# Patient Record
Sex: FEMALE | Race: WHITE | NOT HISPANIC OR LATINO | Employment: OTHER | URBAN - NONMETROPOLITAN AREA
[De-identification: names, ages, dates, MRNs, and addresses within clinical notes are randomized per-mention and may not be internally consistent; named-entity substitution may affect disease eponyms.]

---

## 2019-09-12 ENCOUNTER — TRANSCRIBE ORDERS (OUTPATIENT)
Dept: ADMINISTRATIVE | Facility: HOSPITAL | Age: 54
End: 2019-09-12

## 2019-09-12 ENCOUNTER — HOSPITAL ENCOUNTER (OUTPATIENT)
Dept: GENERAL RADIOLOGY | Facility: HOSPITAL | Age: 54
Discharge: HOME | End: 2019-09-12
Admitting: ORTHOPAEDIC SURGERY

## 2019-09-12 DIAGNOSIS — M54.50 LUMBAR PAIN: ICD-10-CM

## 2019-09-12 DIAGNOSIS — M19.171 POST-TRAUMATIC ARTHRITIS OF RIGHT ANKLE: Primary | ICD-10-CM

## 2019-09-12 PROCEDURE — 72100 X-RAY EXAM L-S SPINE 2/3 VWS: CPT

## 2019-09-12 PROCEDURE — 73610 X-RAY EXAM OF ANKLE: CPT

## 2022-11-16 ENCOUNTER — OFFICE VISIT (OUTPATIENT)
Dept: OBSTETRICS AND GYNECOLOGY | Facility: CLINIC | Age: 57
End: 2022-11-16

## 2022-11-16 VITALS
WEIGHT: 135.4 LBS | SYSTOLIC BLOOD PRESSURE: 130 MMHG | BODY MASS INDEX: 26.58 KG/M2 | HEIGHT: 60 IN | DIASTOLIC BLOOD PRESSURE: 60 MMHG

## 2022-11-16 DIAGNOSIS — R39.9 UTI SYMPTOMS: ICD-10-CM

## 2022-11-16 DIAGNOSIS — R35.0 URINE FREQUENCY: ICD-10-CM

## 2022-11-16 DIAGNOSIS — R10.32 LEFT LOWER QUADRANT PAIN: ICD-10-CM

## 2022-11-16 DIAGNOSIS — Z12.4 SCREENING FOR CERVICAL CANCER: ICD-10-CM

## 2022-11-16 DIAGNOSIS — Z01.419 WELL WOMAN EXAM WITH ROUTINE GYNECOLOGICAL EXAM: Primary | ICD-10-CM

## 2022-11-16 LAB
BILIRUB BLD-MCNC: NEGATIVE MG/DL
CLARITY, POC: CLEAR
COLOR UR: YELLOW
EXPIRATION DATE: ABNORMAL
GLUCOSE UR STRIP-MCNC: NEGATIVE MG/DL
KETONES UR QL: ABNORMAL
LEUKOCYTE EST, POC: ABNORMAL
Lab: ABNORMAL
NITRITE UR-MCNC: NEGATIVE MG/ML
PH UR: 6.5 [PH] (ref 5–8)
PROT UR STRIP-MCNC: NEGATIVE MG/DL
RBC # UR STRIP: ABNORMAL /UL
SP GR UR: 1.01 (ref 1–1.03)
UROBILINOGEN UR QL: NORMAL

## 2022-11-16 PROCEDURE — 99396 PREV VISIT EST AGE 40-64: CPT | Performed by: PHYSICIAN ASSISTANT

## 2022-11-16 PROCEDURE — 99213 OFFICE O/P EST LOW 20 MIN: CPT | Performed by: PHYSICIAN ASSISTANT

## 2022-11-16 RX ORDER — METAXALONE 800 MG/1
800 TABLET ORAL 3 TIMES DAILY PRN
COMMUNITY
Start: 2022-11-02

## 2022-11-16 RX ORDER — IPRATROPIUM BROMIDE AND ALBUTEROL SULFATE 2.5; .5 MG/3ML; MG/3ML
1 SOLUTION RESPIRATORY (INHALATION)
COMMUNITY
Start: 2022-08-01

## 2022-11-16 RX ORDER — AMANTADINE HYDROCHLORIDE 100 MG/1
TABLET ORAL
COMMUNITY
Start: 2022-11-09 | End: 2023-03-08
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
## 2022-11-17 NOTE — PATIENT INSTRUCTIONS
Self breast exam monthly  Annual mammogram  vit D 2000 mg daily  Regular excercise   Follow up for pelvic ultrasound  Urine for culture

## 2022-11-18 LAB — REF LAB TEST METHOD: NORMAL

## 2022-11-21 LAB
APPEARANCE UR: CLEAR
BACTERIA #/AREA URNS HPF: ABNORMAL /HPF
BACTERIA UR CULT: ABNORMAL
BACTERIA UR CULT: ABNORMAL
BILIRUB UR QL STRIP: NEGATIVE
CASTS URNS MICRO: ABNORMAL
COLOR UR: YELLOW
EPI CELLS #/AREA URNS HPF: ABNORMAL /HPF
GLUCOSE UR QL STRIP: NEGATIVE
HGB UR QL STRIP: NEGATIVE
KETONES UR QL STRIP: NEGATIVE
LEUKOCYTE ESTERASE UR QL STRIP: ABNORMAL
NITRITE UR QL STRIP: NEGATIVE
OTHER ANTIBIOTIC SUSC ISLT: ABNORMAL
PH UR STRIP: 6.5 [PH] (ref 5–8)
PROT UR QL STRIP: NEGATIVE
RBC #/AREA URNS HPF: ABNORMAL /HPF
SP GR UR STRIP: 1.01 (ref 1–1.03)
UROBILINOGEN UR STRIP-MCNC: ABNORMAL MG/DL
WBC #/AREA URNS HPF: ABNORMAL /HPF

## 2022-11-21 RX ORDER — NITROFURANTOIN 25; 75 MG/1; MG/1
100 CAPSULE ORAL 2 TIMES DAILY
Qty: 14 CAPSULE | Refills: 0 | Status: SHIPPED | OUTPATIENT
Start: 2022-11-21 | End: 2022-11-28

## 2022-11-21 NOTE — PROGRESS NOTES
Please inform patient urine culture returned positive and I have sent in antibiotic to her pharmacy. Keep appt for ultrasound

## 2022-12-07 ENCOUNTER — TELEPHONE (OUTPATIENT)
Dept: OBSTETRICS AND GYNECOLOGY | Facility: CLINIC | Age: 57
End: 2022-12-07

## 2022-12-07 NOTE — TELEPHONE ENCOUNTER
Lulu,      Patient returned your call, I did not see any notes left to tell her, if you need me to call patient back, I dont mind at all.        Thanks,    Mima

## 2022-12-07 NOTE — TELEPHONE ENCOUNTER
Caller: Didier Rodriguez    Relationship: Self    Best call back number: [phone]    What is the best time to reach you: ANYTIME    Who are you requesting to speak with (clinical staff, provider,  specific staff member): RETURNING CALL FROM THIS MORNING    Do you know the name of the person who called:     What was the call regarding: HER APPT YESTERDAY    Do you require a callback: YES

## 2023-03-08 PROCEDURE — U0004 COV-19 TEST NON-CDC HGH THRU: HCPCS | Performed by: NURSE PRACTITIONER

## 2023-12-04 ENCOUNTER — TRANSCRIBE ORDERS (OUTPATIENT)
Dept: ADMINISTRATIVE | Facility: HOSPITAL | Age: 58
End: 2023-12-04
Payer: COMMERCIAL

## 2023-12-04 DIAGNOSIS — M54.2 CERVICALGIA: Primary | ICD-10-CM

## 2023-12-20 ENCOUNTER — HOSPITAL ENCOUNTER (OUTPATIENT)
Dept: CT IMAGING | Facility: HOSPITAL | Age: 58
Discharge: HOME | End: 2023-12-20
Admitting: PHYSICIAN ASSISTANT
Payer: COMMERCIAL

## 2023-12-20 DIAGNOSIS — M54.2 CERVICALGIA: ICD-10-CM

## 2023-12-20 PROCEDURE — 72125 CT NECK SPINE W/O DYE: CPT

## 2025-04-21 ENCOUNTER — APPOINTMENT (OUTPATIENT)
Dept: CT IMAGING | Facility: HOSPITAL | Age: 60
End: 2025-04-21
Payer: COMMERCIAL

## 2025-04-21 ENCOUNTER — APPOINTMENT (OUTPATIENT)
Dept: GENERAL RADIOLOGY | Facility: HOSPITAL | Age: 60
End: 2025-04-21
Payer: COMMERCIAL

## 2025-04-21 ENCOUNTER — HOSPITAL ENCOUNTER (EMERGENCY)
Facility: HOSPITAL | Age: 60
Discharge: HOME | End: 2025-04-21
Attending: STUDENT IN AN ORGANIZED HEALTH CARE EDUCATION/TRAINING PROGRAM | Admitting: STUDENT IN AN ORGANIZED HEALTH CARE EDUCATION/TRAINING PROGRAM
Payer: COMMERCIAL

## 2025-04-21 VITALS
HEIGHT: 60 IN | WEIGHT: 130 LBS | TEMPERATURE: 98 F | RESPIRATION RATE: 17 BRPM | DIASTOLIC BLOOD PRESSURE: 83 MMHG | BODY MASS INDEX: 25.52 KG/M2 | SYSTOLIC BLOOD PRESSURE: 134 MMHG | HEART RATE: 70 BPM | OXYGEN SATURATION: 100 %

## 2025-04-21 DIAGNOSIS — R10.9 ABDOMINAL PAIN, UNSPECIFIED ABDOMINAL LOCATION: Primary | ICD-10-CM

## 2025-04-21 DIAGNOSIS — K80.20 CALCULUS OF GALLBLADDER WITHOUT CHOLECYSTITIS WITHOUT OBSTRUCTION: ICD-10-CM

## 2025-04-21 LAB
ALBUMIN SERPL-MCNC: 4.8 G/DL (ref 3.5–5.2)
ALBUMIN/GLOB SERPL: 1.2 G/DL
ALP SERPL-CCNC: 98 U/L (ref 39–117)
ALT SERPL W P-5'-P-CCNC: 22 U/L (ref 1–33)
ANION GAP SERPL CALCULATED.3IONS-SCNC: 9.9 MMOL/L (ref 5–15)
AST SERPL-CCNC: 20 U/L (ref 1–32)
BACTERIA UR QL AUTO: ABNORMAL /HPF
BASOPHILS # BLD AUTO: 0.02 10*3/MM3 (ref 0–0.2)
BASOPHILS NFR BLD AUTO: 0.2 % (ref 0–1.5)
BILIRUB SERPL-MCNC: 0.3 MG/DL (ref 0–1.2)
BILIRUB UR QL STRIP: NEGATIVE
BUN SERPL-MCNC: 15 MG/DL (ref 8–23)
BUN/CREAT SERPL: 21.1 (ref 7–25)
CALCIUM SPEC-SCNC: 9.5 MG/DL (ref 8.6–10.5)
CHLORIDE SERPL-SCNC: 98 MMOL/L (ref 98–107)
CLARITY UR: CLEAR
CO2 SERPL-SCNC: 28.1 MMOL/L (ref 22–29)
COLOR UR: YELLOW
CREAT SERPL-MCNC: 0.71 MG/DL (ref 0.57–1)
D-LACTATE SERPL-SCNC: 1.7 MMOL/L (ref 0.5–2)
DEPRECATED RDW RBC AUTO: 41.1 FL (ref 37–54)
EGFRCR SERPLBLD CKD-EPI 2021: 97.5 ML/MIN/1.73
EOSINOPHIL # BLD AUTO: 0.3 10*3/MM3 (ref 0–0.4)
EOSINOPHIL NFR BLD AUTO: 3.4 % (ref 0.3–6.2)
ERYTHROCYTE [DISTWIDTH] IN BLOOD BY AUTOMATED COUNT: 13.1 % (ref 12.3–15.4)
GEN 5 1HR TROPONIN T REFLEX: 8 NG/L
GLOBULIN UR ELPH-MCNC: 3.9 GM/DL
GLUCOSE SERPL-MCNC: 127 MG/DL (ref 65–99)
GLUCOSE UR STRIP-MCNC: NEGATIVE MG/DL
HCT VFR BLD AUTO: 40.7 % (ref 34–46.6)
HGB BLD-MCNC: 13.7 G/DL (ref 12–15.9)
HGB UR QL STRIP.AUTO: NEGATIVE
HOLD SPECIMEN: NORMAL
HOLD SPECIMEN: NORMAL
HYALINE CASTS UR QL AUTO: ABNORMAL /LPF
IMM GRANULOCYTES # BLD AUTO: 0.02 10*3/MM3 (ref 0–0.05)
IMM GRANULOCYTES NFR BLD AUTO: 0.2 % (ref 0–0.5)
KETONES UR QL STRIP: NEGATIVE
LEUKOCYTE ESTERASE UR QL STRIP.AUTO: ABNORMAL
LIPASE SERPL-CCNC: 66 U/L (ref 13–60)
LYMPHOCYTES # BLD AUTO: 3.43 10*3/MM3 (ref 0.7–3.1)
LYMPHOCYTES NFR BLD AUTO: 39.2 % (ref 19.6–45.3)
MCH RBC QN AUTO: 28.6 PG (ref 26.6–33)
MCHC RBC AUTO-ENTMCNC: 33.7 G/DL (ref 31.5–35.7)
MCV RBC AUTO: 85 FL (ref 79–97)
MONOCYTES # BLD AUTO: 0.86 10*3/MM3 (ref 0.1–0.9)
MONOCYTES NFR BLD AUTO: 9.8 % (ref 5–12)
NEUTROPHILS NFR BLD AUTO: 4.11 10*3/MM3 (ref 1.7–7)
NEUTROPHILS NFR BLD AUTO: 47.2 % (ref 42.7–76)
NITRITE UR QL STRIP: NEGATIVE
NRBC BLD AUTO-RTO: 0 /100 WBC (ref 0–0.2)
PH UR STRIP.AUTO: 6 [PH] (ref 5–8)
PLATELET # BLD AUTO: 340 10*3/MM3 (ref 140–450)
PMV BLD AUTO: 9 FL (ref 6–12)
POTASSIUM SERPL-SCNC: 4.4 MMOL/L (ref 3.5–5.2)
PROT SERPL-MCNC: 8.7 G/DL (ref 6–8.5)
PROT UR QL STRIP: NEGATIVE
RBC # BLD AUTO: 4.79 10*6/MM3 (ref 3.77–5.28)
RBC # UR STRIP: ABNORMAL /HPF
REF LAB TEST METHOD: ABNORMAL
SODIUM SERPL-SCNC: 136 MMOL/L (ref 136–145)
SP GR UR STRIP: 1.01 (ref 1–1.03)
SQUAMOUS #/AREA URNS HPF: ABNORMAL /HPF
TROPONIN T NUMERIC DELTA: -1 NG/L
TROPONIN T SERPL HS-MCNC: 9 NG/L
UROBILINOGEN UR QL STRIP: ABNORMAL
WBC # UR STRIP: ABNORMAL /HPF
WBC NRBC COR # BLD AUTO: 8.74 10*3/MM3 (ref 3.4–10.8)
WHOLE BLOOD HOLD COAG: NORMAL
WHOLE BLOOD HOLD SPECIMEN: NORMAL

## 2025-04-21 PROCEDURE — 96374 THER/PROPH/DIAG INJ IV PUSH: CPT

## 2025-04-21 PROCEDURE — 84484 ASSAY OF TROPONIN QUANT: CPT

## 2025-04-21 PROCEDURE — 83605 ASSAY OF LACTIC ACID: CPT

## 2025-04-21 PROCEDURE — 36415 COLL VENOUS BLD VENIPUNCTURE: CPT

## 2025-04-21 PROCEDURE — 25010000002 ONDANSETRON PER 1 MG: Performed by: STUDENT IN AN ORGANIZED HEALTH CARE EDUCATION/TRAINING PROGRAM

## 2025-04-21 PROCEDURE — 84484 ASSAY OF TROPONIN QUANT: CPT | Performed by: STUDENT IN AN ORGANIZED HEALTH CARE EDUCATION/TRAINING PROGRAM

## 2025-04-21 PROCEDURE — 71275 CT ANGIOGRAPHY CHEST: CPT

## 2025-04-21 PROCEDURE — 96375 TX/PRO/DX INJ NEW DRUG ADDON: CPT

## 2025-04-21 PROCEDURE — 25510000001 IOPAMIDOL 61 % SOLUTION: Performed by: STUDENT IN AN ORGANIZED HEALTH CARE EDUCATION/TRAINING PROGRAM

## 2025-04-21 PROCEDURE — 80053 COMPREHEN METABOLIC PANEL: CPT

## 2025-04-21 PROCEDURE — 83690 ASSAY OF LIPASE: CPT

## 2025-04-21 PROCEDURE — 93005 ELECTROCARDIOGRAM TRACING: CPT | Performed by: STUDENT IN AN ORGANIZED HEALTH CARE EDUCATION/TRAINING PROGRAM

## 2025-04-21 PROCEDURE — 85025 COMPLETE CBC W/AUTO DIFF WBC: CPT

## 2025-04-21 PROCEDURE — 74174 CTA ABD&PLVS W/CONTRAST: CPT

## 2025-04-21 PROCEDURE — 99285 EMERGENCY DEPT VISIT HI MDM: CPT | Performed by: STUDENT IN AN ORGANIZED HEALTH CARE EDUCATION/TRAINING PROGRAM

## 2025-04-21 PROCEDURE — 81001 URINALYSIS AUTO W/SCOPE: CPT | Performed by: STUDENT IN AN ORGANIZED HEALTH CARE EDUCATION/TRAINING PROGRAM

## 2025-04-21 PROCEDURE — 71046 X-RAY EXAM CHEST 2 VIEWS: CPT

## 2025-04-21 PROCEDURE — 93005 ELECTROCARDIOGRAM TRACING: CPT

## 2025-04-21 PROCEDURE — 25010000002 MORPHINE PER 10 MG: Performed by: STUDENT IN AN ORGANIZED HEALTH CARE EDUCATION/TRAINING PROGRAM

## 2025-04-21 RX ORDER — DICYCLOMINE HCL 20 MG
20 TABLET ORAL EVERY 6 HOURS PRN
Qty: 28 TABLET | Refills: 0 | Status: SHIPPED | OUTPATIENT
Start: 2025-04-21 | End: 2025-04-28

## 2025-04-21 RX ORDER — MELOXICAM 7.5 MG/1
7.5 TABLET ORAL DAILY
Qty: 7 TABLET | Refills: 0 | Status: SHIPPED | OUTPATIENT
Start: 2025-04-21 | End: 2025-04-28

## 2025-04-21 RX ORDER — IOPAMIDOL 612 MG/ML
100 INJECTION, SOLUTION INTRAVASCULAR
Status: COMPLETED | OUTPATIENT
Start: 2025-04-21 | End: 2025-04-21

## 2025-04-21 RX ORDER — ONDANSETRON 4 MG/1
4 TABLET, ORALLY DISINTEGRATING ORAL EVERY 8 HOURS PRN
Qty: 21 TABLET | Refills: 0 | Status: SHIPPED | OUTPATIENT
Start: 2025-04-21 | End: 2025-04-28

## 2025-04-21 RX ORDER — ONDANSETRON 2 MG/ML
4 INJECTION INTRAMUSCULAR; INTRAVENOUS ONCE
Status: COMPLETED | OUTPATIENT
Start: 2025-04-21 | End: 2025-04-21

## 2025-04-21 RX ORDER — SODIUM CHLORIDE 0.9 % (FLUSH) 0.9 %
10 SYRINGE (ML) INJECTION AS NEEDED
Status: DISCONTINUED | OUTPATIENT
Start: 2025-04-21 | End: 2025-04-21 | Stop reason: HOSPADM

## 2025-04-21 RX ADMIN — MORPHINE SULFATE 4 MG: 4 INJECTION, SOLUTION INTRAMUSCULAR; INTRAVENOUS at 18:20

## 2025-04-21 RX ADMIN — IOPAMIDOL 100 ML: 612 INJECTION, SOLUTION INTRAVENOUS at 19:03

## 2025-04-21 RX ADMIN — ONDANSETRON 4 MG: 2 INJECTION INTRAMUSCULAR; INTRAVENOUS at 18:20

## 2025-04-21 NOTE — ED PROVIDER NOTES
EMERGENCY DEPARTMENT ENCOUNTER    Pt Name: Didier Rodriguez  MRN: [id number]  Pt :   1965  Room Number:  02SF02  Date of encounter:  2025  PCP: Dwayne Marks DO  ED Provider: Julian Gonzales PA-C    Historian: Patient, nursing notes      HPI:  Chief Complaint: Abdominal pain, chest pain, pain radiating to neck and arm, tingling        Context: Didier Rodriguez is a 59 y.o. female who presents to the ED c/o abdominal pain starting this morning.  Patient also reports abdominal distention, and a numbing and tingling sensation radiating up into her neck her chest and her arms.  Patient denies nausea vomiting fever chills, shortness of breath, diaphoresis, lightheadedness or dizziness, or any other complaint.      PAST MEDICAL HISTORY  Past Medical History:   Diagnosis Date    Asthma     Disease of thyroid gland     Migraine          PAST SURGICAL HISTORY  Past Surgical History:   Procedure Laterality Date    HERNIA REPAIR           FAMILY HISTORY  History reviewed. No pertinent family history.      SOCIAL HISTORY  Social History     Socioeconomic History    Marital status:    Tobacco Use    Smoking status: Former     Current packs/day: 0.25     Types: Cigarettes    Smokeless tobacco: Never   Vaping Use    Vaping status: Never Used   Substance and Sexual Activity    Alcohol use: Not Currently    Drug use: Never    Sexual activity: Yes     Partners: Male         ALLERGIES  Bactrim [sulfamethoxazole-trimethoprim], Latex, and Chris flavor [flavoring agent (non-screening)]        REVIEW OF SYSTEMS  Review of Systems   Constitutional:  Negative for chills and fever.   HENT:  Negative for congestion and sore throat.    Respiratory:  Negative for cough and shortness of breath.    Cardiovascular:  Positive for chest pain.   Gastrointestinal:  Positive for abdominal pain. Negative for nausea and vomiting.   Genitourinary:  Negative for dysuria.   Musculoskeletal:  Negative for back pain.   Skin:   Negative for wound.   Neurological:  Positive for numbness. Negative for dizziness and headaches.   Psychiatric/Behavioral:  Negative for confusion.    All other systems reviewed and are negative.         All systems reviewed and negative except for those discussed in HPI.       PHYSICAL EXAM    I have reviewed the triage vital signs and nursing notes.    ED Triage Vitals [04/21/25 1643]   Temp Heart Rate Resp BP SpO2   97.9 °F (36.6 °C) 76 18 140/84 99 %      Temp src Heart Rate Source Patient Position BP Location FiO2 (%)   Oral Monitor Sitting Left arm --       Physical Exam  Vitals and nursing note reviewed.   Constitutional:       General: She is not in acute distress.     Appearance: She is not ill-appearing, toxic-appearing or diaphoretic.   HENT:      Head: Normocephalic and atraumatic.      Mouth/Throat:      Mouth: Mucous membranes are moist.      Pharynx: Oropharynx is clear.   Eyes:      Extraocular Movements: Extraocular movements intact.   Cardiovascular:      Rate and Rhythm: Normal rate.      Heart sounds: Normal heart sounds.   Pulmonary:      Effort: Pulmonary effort is normal. No respiratory distress.      Breath sounds: Normal breath sounds.   Abdominal:      Tenderness: There is abdominal tenderness in the epigastric area and periumbilical area.   Skin:     General: Skin is warm and dry.      Findings: No rash.   Neurological:      Mental Status: She is alert.             LAB RESULTS  Recent Results (from the past 24 hours)   Comprehensive Metabolic Panel    Collection Time: 04/21/25  5:08 PM    Specimen: Blood   Result Value Ref Range    Glucose 127 (H) 65 - 99 mg/dL    BUN 15 8 - 23 mg/dL    Creatinine 0.71 0.57 - 1.00 mg/dL    Sodium 136 136 - 145 mmol/L    Potassium 4.4 3.5 - 5.2 mmol/L    Chloride 98 98 - 107 mmol/L    CO2 28.1 22.0 - 29.0 mmol/L    Calcium 9.5 8.6 - 10.5 mg/dL    Total Protein 8.7 (H) 6.0 - 8.5 g/dL    Albumin 4.8 3.5 - 5.2 g/dL    ALT (SGPT) 22 1 - 33 U/L    AST (SGOT) 20  1 - 32 U/L    Alkaline Phosphatase 98 39 - 117 U/L    Total Bilirubin 0.3 0.0 - 1.2 mg/dL    Globulin 3.9 gm/dL    A/G Ratio 1.2 g/dL    BUN/Creatinine Ratio 21.1 7.0 - 25.0    Anion Gap 9.9 5.0 - 15.0 mmol/L    eGFR 97.5 >60.0 mL/min/1.73   Lipase    Collection Time: 04/21/25  5:08 PM    Specimen: Blood   Result Value Ref Range    Lipase 66 (H) 13 - 60 U/L   Lactic Acid, Plasma    Collection Time: 04/21/25  5:08 PM    Specimen: Blood   Result Value Ref Range    Lactate 1.7 0.5 - 2.0 mmol/L   Green Top (Gel)    Collection Time: 04/21/25  5:08 PM   Result Value Ref Range    Extra Tube Hold for add-ons.    Lavender Top    Collection Time: 04/21/25  5:08 PM   Result Value Ref Range    Extra Tube hold for add-on    Gold Top - SST    Collection Time: 04/21/25  5:08 PM   Result Value Ref Range    Extra Tube Hold for add-ons.    Light Blue Top    Collection Time: 04/21/25  5:08 PM   Result Value Ref Range    Extra Tube Hold for add-ons.    CBC Auto Differential    Collection Time: 04/21/25  5:08 PM    Specimen: Blood   Result Value Ref Range    WBC 8.74 3.40 - 10.80 10*3/mm3    RBC 4.79 3.77 - 5.28 10*6/mm3    Hemoglobin 13.7 12.0 - 15.9 g/dL    Hematocrit 40.7 34.0 - 46.6 %    MCV 85.0 79.0 - 97.0 fL    MCH 28.6 26.6 - 33.0 pg    MCHC 33.7 31.5 - 35.7 g/dL    RDW 13.1 12.3 - 15.4 %    RDW-SD 41.1 37.0 - 54.0 fl    MPV 9.0 6.0 - 12.0 fL    Platelets 340 140 - 450 10*3/mm3    Neutrophil % 47.2 42.7 - 76.0 %    Lymphocyte % 39.2 19.6 - 45.3 %    Monocyte % 9.8 5.0 - 12.0 %    Eosinophil % 3.4 0.3 - 6.2 %    Basophil % 0.2 0.0 - 1.5 %    Immature Grans % 0.2 0.0 - 0.5 %    Neutrophils, Absolute 4.11 1.70 - 7.00 10*3/mm3    Lymphocytes, Absolute 3.43 (H) 0.70 - 3.10 10*3/mm3    Monocytes, Absolute 0.86 0.10 - 0.90 10*3/mm3    Eosinophils, Absolute 0.30 0.00 - 0.40 10*3/mm3    Basophils, Absolute 0.02 0.00 - 0.20 10*3/mm3    Immature Grans, Absolute 0.02 0.00 - 0.05 10*3/mm3    nRBC 0.0 0.0 - 0.2 /100 WBC   High Sensitivity  Troponin T    Collection Time: 04/21/25  5:08 PM    Specimen: Blood   Result Value Ref Range    HS Troponin T 9 <14 ng/L   Urinalysis With Microscopic If Indicated (No Culture) - Urine, Clean Catch    Collection Time: 04/21/25  5:41 PM    Specimen: Urine, Clean Catch   Result Value Ref Range    Color, UA Yellow Yellow, Straw    Appearance, UA Clear Clear    pH, UA 6.0 5.0 - 8.0    Specific Gravity, UA 1.012 1.005 - 1.030    Glucose, UA Negative Negative    Ketones, UA Negative Negative    Bilirubin, UA Negative Negative    Blood, UA Negative Negative    Protein, UA Negative Negative    Leuk Esterase, UA Small (1+) (A) Negative    Nitrite, UA Negative Negative    Urobilinogen, UA 0.2 E.U./dL 0.2 - 1.0 E.U./dL   Urinalysis, Microscopic Only - Urine, Clean Catch    Collection Time: 04/21/25  5:41 PM    Specimen: Urine, Clean Catch   Result Value Ref Range    RBC, UA None Seen None Seen, 0-2 /HPF    WBC, UA 0-2 None Seen, 0-2 /HPF    Bacteria, UA Trace (A) None Seen /HPF    Squamous Epithelial Cells, UA 0-2 None Seen, 0-2 /HPF    Hyaline Casts, UA None Seen None Seen /LPF    Methodology Manual Light Microscopy    High Sensitivity Troponin T 1Hr    Collection Time: 04/21/25  6:19 PM    Specimen: Blood   Result Value Ref Range    HS Troponin T 8 <14 ng/L    Troponin T Numeric Delta -1 Abnormal if >/=3 ng/L       If labs were ordered, I independently reviewed the results and considered them in treating the patient.        RADIOLOGY  CT Angiogram Chest  Result Date: 4/21/2025  FINAL REPORT TECHNIQUE: null CLINICAL HISTORY: CP radiating to left neck, left arm and left leg COMPARISON: null FINDINGS: CT angiogram of chest with MIP postprocessing: Comparison: None FINDINGS: Pulmonary arteries: Pulmonary arteries are well-opacified, free of intraluminal thrombus. Thoracic aorta: Normal caliber without dissection. Mediastinum/heart: Heart size is normal. No pericardial effusion. No enlarged mediastinal, hilar, or axillary lymph  nodes. Calcified mediastinal lymph nodes. Trachea and mainstem bronchi of normal caliber without intraluminal lesion. Visualized thyroid gland without mass or enlargement. Esophagus without significant distention or evidence of mass. Lungs/pleura: Lungs are well expanded, free of infiltrates, soft tissue nodule, or chronic interstitial change. Calcified granuloma right apex and lateral aspect of the left lower lobe superior segment. No pleural effusion or pneumothorax. Uppermost abdomen: No adrenal mass. Cholelithiasis. Bone/MSK: Mild to moderate apex left scoliosis centered at mid to upper thoracic levels. Mild degenerative disc disease. Chronic appearing inferior endplate compression fracture involving T9. No acute fracture. No destructive osseous lesion.     IMPRESSION: 1. Negative for acute pulmonary artery embolism. Thoracic aorta of normal caliber without dissection. 2. No consolidation, pleural effusion, or pneumothorax. 3. Sequela of prior granulomatous infection. 4. Cholelithiasis. 5. Other findings above. Authenticated and Electronically Signed by Mc Sexton MD on 04/21/2025 08:01:05 PM    CT Angiogram Abdomen Pelvis  Result Date: 4/21/2025  FINAL REPORT TECHNIQUE: null CLINICAL HISTORY: abdominal pain, distension, pain radiating to back COMPARISON: null FINDINGS: CTA abdomen and pelvis with IV contrast and MIP/3D reconstructions. Comparison: None FINDINGS: Abdominal aorta and its branches: Normal caliber abdominal aorta and iliac arteries without dissection or atheromatous change/stenosis. Widely patent mesenteric and renal branches. No para-aortic adenopathy or hematoma. Lung bases: Clear. Upper Abdomen: The liver, spleen, and pancreas are within normal limits. Nondistended gallbladder containing 2 calcified gallstones measuring less than 1 cm. Normal caliber CBD. Retroperitoneum/Pelvis: No adrenal mass. Normal-appearing kidneys. No hydronephrosis or nephroureterolithiasis. Normal-appearing urinary  bladder. Unremarkable uterus. No adnexal mass. Bowel/Mesentery: There is normal stool volume within the colon. No evidence of mass/obstruction. No diverticular disease or inflammation. Appendix is normal. Small bowel loops are nondilated, without evidence of mural thickening. No mesenteric mass or pathologic adenopathy. No focal gastric abnormality. No free air or fluid. Bone/Extraperitoneal Soft Tissues: Moderate facet arthropathy at lower lumbar levels with mild degenerative disc disease. No fracture or destructive process involving the lumbar spine or bony pelvis.     IMPRESSION: 1. Normal caliber abdominal aorta without dissection. Widely patent major aortic branches. 2. Cholelithiasis without CT evidence of acute cholecystitis. 3. No bowel obstruction or inflammation, free air or fluid. 4. Other findings above. Authenticated and Electronically Signed by Mc Sexton MD on 04/21/2025 07:54:32 PM      I ordered and independently reviewed the above noted radiographic studies.      I viewed images of chest x-ray which showed no acute findings per my independent interpretation.    I viewed images of CTA chest which showed no acute findings per my independent interpretation    I viewed images of CTA abdomen which showed cholelithiasis, no acute findings per my independent interpretation    See radiologist's dictation for official interpretation.        PROCEDURES    Procedures    ECG 12 Lead Chest Pain   Final Result          MEDICATIONS GIVEN IN ER    Medications   sodium chloride 0.9 % flush 10 mL (has no administration in time range)   morphine injection 4 mg (4 mg Intravenous Given 4/21/25 1820)   ondansetron (ZOFRAN) injection 4 mg (4 mg Intravenous Given 4/21/25 1820)   iopamidol (ISOVUE-300) 61 % injection 100 mL (100 mL Intravenous Given 4/21/25 1903)         MEDICAL DECISION MAKING, PROGRESS, and CONSULTS    All labs, if obtained, have been independently reviewed by me.  All radiology studies, if obtained,  have been reviewed by me and the radiologist dictating the report.  All EKG's, if obtained, have been independently viewed and interpreted by me/my attending physician.      Discussion below represents my analysis of pertinent findings related to patient's condition, differential diagnosis, treatment plan and final disposition.    Patient is a 59-year-old female presenting to the emergency department for evaluation of abdominal pain, and intermittent pains numbness and tingling coming from her abdomen and chest radiating to the neck and left arm.  On arrival patient is upright alert oriented no acute distress today resting comfortably in the exam chair.  She has mild generalized abdominal tenderness to palpation but no guarding or rebound.   vital signs and pulse oximetry is independently interpreted by me are all stable within normal limits.  Plan will be for CTA chest abdomen pelvis, cardiac workup to include EKG and troponin, and abdominal pain labs and urinalysis.    Lab work today overall bland and reassuring with no leukocytosis, normal H&H, normal lactic acid, mildly elevated lipase, unremarkable chemistries, high-sensitivity troponins were within the normal range and stable EKG showed no acute ischemic changes normal sinus rhythm normal rate.  Attending interpretation urinalysis negative for infection.  CTAs of the chest abdomen pelvis showed cholelithiasis no evidence of cholecystitis, no other acute findings, no dissection or PE noted.  On reevaluation patient remains resting comfortably with stable vital signs in no acute distress and is nontoxic and non-ill in appearance.  Repeat abdominal exam is benign.  I therefore feel the patient is stable for discharge and outpatient follow-up with primary care.  Strict ED return precautions were explained and patient verbalized understanding of and agreement this plan of care.                       Differential diagnosis:    Differential diagnosis included but was  not limited to gastritis, GERD, pancreatitis, constipation, cholecystitis, acute coronary syndrome, dissection, pneumonia, abdominal strain, UTI      Additional sources:    - Discussed/ obtained information from independent historians: None    - External (non-ED) record review: Previous medical records reviewed    - Chronic or social conditions impacting care: None    Orders placed during this visit:  Orders Placed This Encounter   Procedures    XR Chest 2 View    CT Angiogram Chest    CT Angiogram Abdomen Pelvis    [place] Draw    Comprehensive Metabolic Panel    Lipase    Urinalysis With Microscopic If Indicated (No Culture) - Urine, Clean Catch    Lactic Acid, Plasma    CBC Auto Differential    High Sensitivity Troponin T    High Sensitivity Troponin T 1Hr    Urinalysis, Microscopic Only - Urine, Clean Catch    Ambulatory Referral to General Surgery    NPO Diet NPO Type: Strict NPO    Undress & Gown    Continuous Pulse Oximetry    Monitor Blood Pressure    ECG 12 Lead Chest Pain    Insert Peripheral IV    CBC & Differential    Green Top (Gel)    Lavender Top    Gold Top - SST    Light Blue Top         Additional orders considered but not ordered: None      ED Course:    Consultants: None    ED Course as of 04/21/25 2026 Mon Apr 21, 2025   1746 I have reviewed the mid-level provider(s) note and verbally discussed the case/plan of care.  I was available for consultation as needed at all times during the patient's visit in the Emergency Department.  I agree with the clinical impression, plan, and disposition unless otherwise stated in the MDM below.    ATTENDING ATTESTATION  HPI: 59 y.o. female who presents with periumbilical abdominal pain that started today.  Also reports associated chest pain that radiates to the neck.    MDM: ED workup reviewed.    EKG per my interpretation normal sinus rhythm, rate 71, normal axis, no STEMI, normal QRS QTC intervals.    I have reviewed the labs results. Clinically  unremarkable.    Notable findings include: Lipase 66.    Radiology reports reviewed.     CT Angiogram Chest  Result Date: 4/21/2025  IMPRESSION: 1. Negative for acute pulmonary artery embolism. Thoracic aorta of normal caliber without dissection. 2. No consolidation, pleural effusion, or pneumothorax. 3. Sequela of prior granulomatous infection. 4. Cholelithiasis. 5. Other findings above. Authenticated and Electronically Signed by Mc Sextno MD on 04/21/2025 08:01:05 PM    CT Angiogram Abdomen Pelvis  Result Date: 4/21/2025  IMPRESSION: 1. Normal caliber abdominal aorta without dissection. Widely patent major aortic branches. 2. Cholelithiasis without CT evidence of acute cholecystitis. 3. No bowel obstruction or inflammation, free air or fluid. 4. Other findings above. Authenticated and Electronically Signed by Mc Sexton MD on 04/21/2025 07:54:32 PM     [JS]      ED Course User Index  [JS] Justo English DO              Shared Decision Making:  After my consideration of clinical presentation and any laboratory/radiology studies obtained, I discussed the findings with the patient/patient representative who is in agreement with the treatment plan and the final disposition.   Risks and benefits of discharge and/or observation/admission were discussed.       AS OF 20:26 EDT VITALS:    BP - 114/72  HR - 70  TEMP - 98 °F (36.7 °C) (Oral)  O2 SATS - 99%                  DIAGNOSIS  Final diagnoses:   Abdominal pain, unspecified abdominal location   Calculus of gallbladder without cholecystitis without obstruction         DISPOSITION  Discharge      Please note that portions of this document were completed with voice recognition software.      Julian Gonzales PA-C  04/21/25 2026

## 2025-04-22 ENCOUNTER — OFFICE VISIT (OUTPATIENT)
Dept: SURGERY | Facility: CLINIC | Age: 60
End: 2025-04-22
Payer: COMMERCIAL

## 2025-04-22 VITALS
SYSTOLIC BLOOD PRESSURE: 122 MMHG | RESPIRATION RATE: 18 BRPM | HEIGHT: 60 IN | DIASTOLIC BLOOD PRESSURE: 64 MMHG | TEMPERATURE: 98.6 F | BODY MASS INDEX: 26.31 KG/M2 | WEIGHT: 134 LBS | OXYGEN SATURATION: 98 % | HEART RATE: 70 BPM

## 2025-04-22 DIAGNOSIS — R10.2 SUPRAPUBIC ABDOMINAL PAIN: ICD-10-CM

## 2025-04-22 DIAGNOSIS — K80.20 CALCULUS OF GALLBLADDER WITHOUT CHOLECYSTITIS WITHOUT OBSTRUCTION: ICD-10-CM

## 2025-04-22 DIAGNOSIS — N30.00 ACUTE CYSTITIS WITHOUT HEMATURIA: Primary | ICD-10-CM

## 2025-04-22 PROCEDURE — 1159F MED LIST DOCD IN RCRD: CPT | Performed by: STUDENT IN AN ORGANIZED HEALTH CARE EDUCATION/TRAINING PROGRAM

## 2025-04-22 PROCEDURE — 99204 OFFICE O/P NEW MOD 45 MIN: CPT | Performed by: STUDENT IN AN ORGANIZED HEALTH CARE EDUCATION/TRAINING PROGRAM

## 2025-04-22 PROCEDURE — 1160F RVW MEDS BY RX/DR IN RCRD: CPT | Performed by: STUDENT IN AN ORGANIZED HEALTH CARE EDUCATION/TRAINING PROGRAM

## 2025-04-22 RX ORDER — NITROFURANTOIN 25; 75 MG/1; MG/1
100 CAPSULE ORAL 2 TIMES DAILY
Qty: 10 CAPSULE | Refills: 0 | Status: SHIPPED | OUTPATIENT
Start: 2025-04-22 | End: 2025-04-27

## 2025-04-22 NOTE — PROGRESS NOTES
Patient: Didier Rodriguez    YOB: 1965    Date: 04/22/2025    Primary Care Provider: Dwayne Marks DO    Chief Complaint   Patient presents with    Abdominal Pain     ED follow up-abdominal pain       SUBJECTIVE:    History of present illness:  The patient is in the office today for evaluation and treatment of abdominal pain. Patient states that he has abdominal distention, and a numbing and tingling sensation radiating up into her neck her chest and her arms. CT angiogram was performed at ED 4/21/25 which showed normal caliber abdominal aorta without dissection. Widely patent major aortic branches. Cholelithiasis without CT evidence of acute cholecystitis. No bowel obstruction or inflammation, free air or fluid. Patient states that yesterday morning she developed suprapubic abdominal pain. She has continued to have pain in the suprapubic region. She also reports urinary frequency. Denies dysuria. Patient denies any right upper quadrant pain, nausea, vomiting, or diarrhea. She awoke with the pain. She states she had cabbage for dinner the night prior. She does have a history of gallstones.    The following portions of the patient's history were reviewed and updated as appropriate: allergies, current medications, past family history, past medical history, past social history, past surgical history and problem list.      Review of Systems:  Constitutional:  Negative for chills, fever, and unexpected weight change.  HENT: Negative for trouble swallowing and voice change.  Eyes:  Negative for visual disturbance.  Respiratory:  Negative for apnea, cough, chest tightness, shortness of breath, and wheezing.  Cardiovascular:  Negative for chest pain, palpitations, and leg swelling.  Gastrointestinal:  Positive for suprapubic abdominal pain, urinary frequency  Musculoskeletal:  Negative for back pain, gait problem, and joint swelling.  Skin:  Negative for color change, rash, and wound  Neurological:   Negative for dizziness, syncope, speech difficulty, weakness, numbness, and headaches.  Hematological:  Negative for adenopathy.  Does not bruise/bleed easily.  Psychiatric/Behavioral:  Negative for confusion.  The patient is not nervous/anxious.    Allergies:  Allergies   Allergen Reactions    Bactrim [Sulfamethoxazole-Trimethoprim] Hives    Latex Rash    Chris Flavor [Flavoring Agent (Non-Screening)] Rash       Medications:    Current Outpatient Medications:     cetirizine (zyrTEC) 10 MG tablet, Take 1 tablet by mouth Daily., Disp: , Rfl:     Fluticasone-Salmeterol (ADVAIR/WIXELA) 100-50 MCG/ACT DISKUS, Inhale 2 (Two) Times a Day., Disp: , Rfl:     ipratropium-albuterol (DUO-NEB) 0.5-2.5 mg/3 ml nebulizer, Inhale 1 mL., Disp: , Rfl:     levothyroxine (SYNTHROID, LEVOTHROID) 75 MCG tablet, Take 1 tablet by mouth Daily., Disp: , Rfl:     meloxicam (MOBIC) 7.5 MG tablet, Take 1 tablet by mouth Daily for 7 days., Disp: 7 tablet, Rfl: 0    metaxalone (SKELAXIN) 800 MG tablet, Take 1 tablet by mouth 3 (Three) Times a Day As Needed. for muscle spams, Disp: , Rfl:     ondansetron ODT (ZOFRAN-ODT) 4 MG disintegrating tablet, Place 1 tablet on the tongue Every 8 (Eight) Hours As Needed for Nausea or Vomiting for up to 7 days., Disp: 21 tablet, Rfl: 0    promethazine-dextromethorphan (PROMETHAZINE-DM) 6.25-15 MG/5ML syrup, Take 5 mL by mouth 4 (Four) Times a Day As Needed for Cough., Disp: 150 mL, Rfl: 0    amoxicillin (AMOXIL) 875 MG tablet, 1 po bid for 10 days (Patient not taking: Reported on 4/22/2025), Disp: 20 tablet, Rfl: 0    dicyclomine (BENTYL) 20 MG tablet, Take 1 tablet by mouth Every 6 (Six) Hours As Needed for Abdominal Cramping for up to 7 days. (Patient not taking: Reported on 4/22/2025), Disp: 28 tablet, Rfl: 0    nitrofurantoin, macrocrystal-monohydrate, (Macrobid) 100 MG capsule, Take 1 capsule by mouth 2 (Two) Times a Day for 5 days., Disp: 10 capsule, Rfl: 0  No current facility-administered medications  "for this visit.    History:  Past Medical History:   Diagnosis Date    Asthma     Disease of thyroid gland     Migraine        Past Surgical History:   Procedure Laterality Date    HERNIA REPAIR      infant       History reviewed. No pertinent family history.    Social History     Tobacco Use    Smoking status: Former     Current packs/day: 0.25     Types: Cigarettes    Smokeless tobacco: Never   Vaping Use    Vaping status: Never Used   Substance Use Topics    Alcohol use: Not Currently    Drug use: Never        OBJECTIVE:    Vital Signs:   Vitals:    04/22/25 1057   BP: 122/64   Pulse: 70   Resp: 18   Temp: 98.6 °F (37 °C)   TempSrc: Temporal   SpO2: 98%   Weight: 60.8 kg (134 lb)   Height: 152.4 cm (60\")     Physical Exam:     General Appearance:    Alert, cooperative, in no acute distress   Head:    Normocephalic, without obvious abnormality, atraumatic   Eyes:            Lids and lashes normal, conjunctivae and sclerae normal, no   icterus, no pallor, corneas clear, PERRLA   Throat:   No oral lesions, no thrush, oral mucosa moist   Lungs:     Clear to auscultation,respirations regular, even and                  unlabored    Heart:    Regular rhythm and normal rate, normal S1 and S2, no            murmur, no gallop, no rub, no click   Abdomen:    Soft, tender to palpation in suprapubic region, no RUQ pain, negative Lancaster's sign, non-distended.   Extremities:   Moves all extremities well, no edema, no cyanosis, no             redness   Pulses:   Pulses palpable and equal bilaterally   Skin:   No bleeding, bruising or rash   Neurologic:   Cranial nerves 2 - 12 grossly intact, sensation intact, DTR       present and equal bilaterally       Results Review:   I reviewed the patient's new clinical results.  I reviewed the patient's new imaging results and agree with the interpretation.    Review of Systems was reviewed and confirmed as accurate as documented by the MA.    ASSESSMENT/PLAN:    1. Acute cystitis without " "hematuria    2. Suprapubic abdominal pain      Patient was referred to me after cholelithiasis was identified on her CT abdomen from ED visit yesterday. After discussion with the patient, it sounds more like she has a symptomatic UTI. She has suprapubic pain and urinary frequency. She did have trace bacteria and 1+ leuk esterase on her urine yesterday. She does have gallstones, but they seem to be largely asymptomatic. She denies RUQ pain, nausea, vomiting, diarrhea. She denies pain provoked by certain foods or fatty greasy foods. Patient and daughter are concerned about her gallstones because her mother did pass away from pancreatic cancer \"that was caused by gallstones\".    I did review her CT and she does have about 2 stones without evidence of cholecystitis. Her laboratory studies were also largely unremarkable. We proceeded with an ultrasound in office today that showed 2 stones in the neck, but no ductal dilation or evidence of cholecystitis.At this time because she seems asymptomatic, I do not think she necessarily needs surgery. She can continue to monitor whether she develops any pain in the RUQ or epigastrium with certain foods. I did provide the patient with a prescription for Macrobid today to treat her UTI. Patient is agreeable to the plan and all of her questions were answered.      Electronically signed by Kimberly Macias DO  04/22/25          "

## 2025-04-22 NOTE — DISCHARGE INSTRUCTIONS
We have recommended that you follow-up with general surgery about your abdominal pain and gallstones.  This could be contributing to your pain otherwise there were no acute findings on your CT scans of your chest or abdomen and your lab work today was overall reassuring.  There was no evidence of heart attack or other cause of your chest discomfort.  Return to the ER for any acute changes or worsening of your condition.  We have prescribed you anti-inflammatory pain medication along with medications to help with abdominal cramps and nausea.